# Patient Record
Sex: MALE | URBAN - METROPOLITAN AREA
[De-identification: names, ages, dates, MRNs, and addresses within clinical notes are randomized per-mention and may not be internally consistent; named-entity substitution may affect disease eponyms.]

---

## 2019-10-04 ENCOUNTER — APPOINTMENT (RX ONLY)
Dept: URBAN - METROPOLITAN AREA CLINIC 139 | Facility: CLINIC | Age: 39
Setting detail: DERMATOLOGY
End: 2019-10-04

## 2019-10-04 DIAGNOSIS — L90.5 SCAR CONDITIONS AND FIBROSIS OF SKIN: ICD-10-CM

## 2019-10-04 DIAGNOSIS — D18.0 HEMANGIOMA: ICD-10-CM

## 2019-10-04 DIAGNOSIS — L70.0 ACNE VULGARIS: ICD-10-CM

## 2019-10-04 PROBLEM — D18.01 HEMANGIOMA OF SKIN AND SUBCUTANEOUS TISSUE: Status: ACTIVE | Noted: 2019-10-04

## 2019-10-04 PROCEDURE — ? COUNSELING

## 2019-10-04 PROCEDURE — ? PULSED-DYE LASER

## 2019-10-04 PROCEDURE — 99202 OFFICE O/P NEW SF 15 MIN: CPT

## 2019-10-04 PROCEDURE — ? PRESCRIPTION

## 2019-10-04 PROCEDURE — ? MEDICAL CONSULTATION: PULSED-DYE LASER

## 2019-10-04 PROCEDURE — ? PRESCRIPTION MEDICATION MANAGEMENT

## 2019-10-04 RX ORDER — TAZAROTENE 1 MG/G
1 AEROSOL, FOAM TOPICAL QHS
Qty: 50 | Refills: 1 | Status: ERX | COMMUNITY
Start: 2019-10-04

## 2019-10-04 RX ADMIN — TAZAROTENE 1: 1 AEROSOL, FOAM TOPICAL at 13:16

## 2019-10-04 ASSESSMENT — LOCATION DETAILED DESCRIPTION DERM
LOCATION DETAILED: SUPERIOR THORACIC SPINE
LOCATION DETAILED: LEFT MEDIAL INFERIOR CHEST
LOCATION DETAILED: RIGHT MEDIAL INFERIOR CHEST
LOCATION DETAILED: STERNUM
LOCATION DETAILED: LEFT SUPERIOR MEDIAL UPPER BACK
LOCATION DETAILED: LEFT LATERAL SUPERIOR CHEST
LOCATION DETAILED: LEFT MEDIAL SUPERIOR CHEST
LOCATION DETAILED: RIGHT LATERAL SUPERIOR CHEST

## 2019-10-04 ASSESSMENT — LOCATION SIMPLE DESCRIPTION DERM
LOCATION SIMPLE: LEFT UPPER BACK
LOCATION SIMPLE: CHEST
LOCATION SIMPLE: UPPER BACK

## 2019-10-04 ASSESSMENT — LOCATION ZONE DERM: LOCATION ZONE: TRUNK

## 2019-10-04 NOTE — PROCEDURE: COUNSELING
Detail Level: Detailed
Dapsone Counseling: I discussed with the patient the risks of dapsone including but not limited to hemolytic anemia, agranulocytosis, rashes, methemoglobinemia, kidney failure, peripheral neuropathy, headaches, GI upset, and liver toxicity.  Patients who start dapsone require monitoring including baseline LFTs and weekly CBCs for the first month, then every month thereafter.  The patient verbalized understanding of the proper use and possible adverse effects of dapsone.  All of the patient's questions and concerns were addressed.
Bactrim Counseling:  I discussed with the patient the risks of sulfa antibiotics including but not limited to GI upset, allergic reaction, drug rash, diarrhea, dizziness, photosensitivity, and yeast infections.  Rarely, more serious reactions can occur including but not limited to aplastic anemia, agranulocytosis, methemoglobinemia, blood dyscrasias, liver or kidney failure, lung infiltrates or desquamative/blistering drug rashes.
High Dose Vitamin A Counseling: Side effects reviewed, pt to contact office should one occur.
Erythromycin Counseling:  I discussed with the patient the risks of erythromycin including but not limited to GI upset, allergic reaction, drug rash, diarrhea, increase in liver enzymes, and yeast infections.
Benzoyl Peroxide Counseling: Patient counseled that medicine may cause skin irritation and bleach clothing.  In the event of skin irritation, the patient was advised to reduce the amount of the drug applied or use it less frequently.   The patient verbalized understanding of the proper use and possible adverse effects of benzoyl peroxide.  All of the patient's questions and concerns were addressed.
Spironolactone Counseling: Patient advised regarding risks of diarrhea, abdominal pain, hyperkalemia, birth defects (for female patients), liver toxicity and renal toxicity. The patient may need blood work to monitor liver and kidney function and potassium levels while on therapy. The patient verbalized understanding of the proper use and possible adverse effects of spironolactone.  All of the patient's questions and concerns were addressed.
Tazorac Counseling:  Patient advised that medication is irritating and drying.  Patient may need to apply sparingly and wash off after an hour before eventually leaving it on overnight.  The patient verbalized understanding of the proper use and possible adverse effects of tazorac.  All of the patient's questions and concerns were addressed.
Birth Control Pills Pregnancy And Lactation Text: This medication should be avoided if pregnant and for the first 30 days post-partum.
Azithromycin Pregnancy And Lactation Text: This medication is considered safe during pregnancy and is also secreted in breast milk.
Isotretinoin Pregnancy And Lactation Text: This medication is Pregnancy Category X and is considered extremely dangerous during pregnancy. It is unknown if it is excreted in breast milk.
Doxycycline Pregnancy And Lactation Text: This medication is Pregnancy Category D and not consider safe during pregnancy. It is also excreted in breast milk but is considered safe for shorter treatment courses.
Minocycline Pregnancy And Lactation Text: This medication is Pregnancy Category D and not consider safe during pregnancy. It is also excreted in breast milk.
Topical Clindamycin Pregnancy And Lactation Text: This medication is Pregnancy Category B and is considered safe during pregnancy. It is unknown if it is excreted in breast milk.
Topical Retinoid Pregnancy And Lactation Text: This medication is Pregnancy Category C. It is unknown if this medication is excreted in breast milk.
Azithromycin Counseling:  I discussed with the patient the risks of azithromycin including but not limited to GI upset, allergic reaction, drug rash, diarrhea, and yeast infections.
Doxycycline Counseling:  Patient counseled regarding possible photosensitivity and increased risk for sunburn.  Patient instructed to avoid sunlight, if possible.  When exposed to sunlight, patients should wear protective clothing, sunglasses, and sunscreen.  The patient was instructed to call the office immediately if the following severe adverse effects occur:  hearing changes, easy bruising/bleeding, severe headache, or vision changes.  The patient verbalized understanding of the proper use and possible adverse effects of doxycycline.  All of the patient's questions and concerns were addressed.
Birth Control Pills Counseling: Birth Control Pill Counseling: I discussed with the patient the potential side effects of OCPs including but not limited to increased risk of stroke, heart attack, thrombophlebitis, deep venous thrombosis, hepatic adenomas, breast changes, GI upset, headaches, and depression.  The patient verbalized understanding of the proper use and possible adverse effects of OCPs. All of the patient's questions and concerns were addressed.
Minocycline Counseling: Patient advised regarding possible photosensitivity and discoloration of the teeth, skin, lips, tongue and gums.  Patient instructed to avoid sunlight, if possible.  When exposed to sunlight, patients should wear protective clothing, sunglasses, and sunscreen.  The patient was instructed to call the office immediately if the following severe adverse effects occur:  hearing changes, easy bruising/bleeding, severe headache, or vision changes.  The patient verbalized understanding of the proper use and possible adverse effects of minocycline.  All of the patient's questions and concerns were addressed.
Isotretinoin Counseling: Patient should get monthly blood tests, not donate blood, not drive at night if vision affected, not share medication, and not undergo elective surgery for 6 months after tx completed. Side effects reviewed, pt to contact office should one occur.
Topical Retinoid counseling:  Patient advised to apply a pea-sized amount only at bedtime and wait 30 minutes after washing their face before applying.  If too drying, patient may add a non-comedogenic moisturizer. The patient verbalized understanding of the proper use and possible adverse effects of retinoids.  All of the patient's questions and concerns were addressed.
Tetracycline Counseling: Patient counseled regarding possible photosensitivity and increased risk for sunburn.  Patient instructed to avoid sunlight, if possible.  When exposed to sunlight, patients should wear protective clothing, sunglasses, and sunscreen.  The patient was instructed to call the office immediately if the following severe adverse effects occur:  hearing changes, easy bruising/bleeding, severe headache, or vision changes.  The patient verbalized understanding of the proper use and possible adverse effects of tetracycline.  All of the patient's questions and concerns were addressed. Patient understands to avoid pregnancy while on therapy due to potential birth defects.
Detail Level: Zone
Use Enhanced Medication Counseling?: No
Topical Clindamycin Counseling: Patient counseled that this medication may cause skin irritation or allergic reactions.  In the event of skin irritation, the patient was advised to reduce the amount of the drug applied or use it less frequently.   The patient verbalized understanding of the proper use and possible adverse effects of clindamycin.  All of the patient's questions and concerns were addressed.
Bactrim Pregnancy And Lactation Text: This medication is Pregnancy Category D and is known to cause fetal risk.  It is also excreted in breast milk.
Erythromycin Pregnancy And Lactation Text: This medication is Pregnancy Category B and is considered safe during pregnancy. It is also excreted in breast milk.
Dapsone Pregnancy And Lactation Text: This medication is Pregnancy Category C and is not considered safe during pregnancy or breast feeding.
Topical Sulfur Applications Counseling: Topical Sulfur Counseling: Patient counseled that this medication may cause skin irritation or allergic reactions.  In the event of skin irritation, the patient was advised to reduce the amount of the drug applied or use it less frequently.   The patient verbalized understanding of the proper use and possible adverse effects of topical sulfur application.  All of the patient's questions and concerns were addressed.
Spironolactone Pregnancy And Lactation Text: This medication can cause feminization of the male fetus and should be avoided during pregnancy. The active metabolite is also found in breast milk.
High Dose Vitamin A Pregnancy And Lactation Text: High dose vitamin A therapy is contraindicated during pregnancy and breast feeding.
Topical Sulfur Applications Pregnancy And Lactation Text: This medication is Pregnancy Category C and has an unknown safety profile during pregnancy. It is unknown if this topical medication is excreted in breast milk.
Tazorac Pregnancy And Lactation Text: This medication is not safe during pregnancy. It is unknown if this medication is excreted in breast milk.
Benzoyl Peroxide Pregnancy And Lactation Text: This medication is Pregnancy Category C. It is unknown if benzoyl peroxide is excreted in breast milk.

## 2019-10-04 NOTE — HPI: SCAR (COMPLEX), BACK
How Severe Is The Scar?: mild
What Are Your Concerns? (Check All That Apply): texture
Is This A New Presentation, Or A Follow-Up?: Back Scar
Any Other Pertinent Features?: Patient uses OTC benzoyl peroxide acne lotion which helps prevent acne breakouts.

## 2019-10-04 NOTE — PROCEDURE: PULSED-DYE LASER
Spot Size: 7 mm
Delay Time (Ms): 20
Pulse Duration: 10 ms
Treated Area: large area
Post-Care Instructions: I reviewed with the patient in detail post-care instructions. Patient should stay away from the sun and wear sun protection until treated areas are fully healed.
Laser Type: Vbeam 595nm
Pulse Duration: 3 ms
Cryogen Time (Ms): 30
Post-Procedure Care: Vaseline and ice applied. Post care reviewed with patient.
Fluence In J/Cm2 (Optional): 7.5
Detail Level: Zone
Consent: Written consent obtained, risks reviewed including but not limited to crusting, scabbing, blistering, scarring, darker or lighter pigmentary change, incidental hair removal, bruising, and/or incomplete removal.
Post-Procedure Care: Post care reviewed with patient.
Cryogen Time (Ms): 30

## 2019-10-04 NOTE — PROCEDURE: PRESCRIPTION MEDICATION MANAGEMENT
Detail Level: Zone
Render In Strict Bullet Format?: No
Initiate Treatment: Fabior as prescribed above. BPO 10% wash qd in shower, leave on for 5 min prior to rinsing.

## 2019-10-08 ENCOUNTER — RX ONLY (OUTPATIENT)
Age: 39
Setting detail: RX ONLY
End: 2019-10-08

## 2019-10-08 RX ORDER — TAZAROTENE 1 MG/G
AEROSOL, FOAM TOPICAL QHS
Qty: 1 | Refills: 0

## 2019-10-17 ENCOUNTER — RX ONLY (OUTPATIENT)
Age: 39
Setting detail: RX ONLY
End: 2019-10-17

## 2019-10-17 RX ORDER — TRETINOIN 1 MG/G
1 CREAM TOPICAL QHS
Qty: 1 | Refills: 1 | COMMUNITY
Start: 2019-10-17

## 2019-11-01 ENCOUNTER — APPOINTMENT (RX ONLY)
Dept: URBAN - METROPOLITAN AREA CLINIC 139 | Facility: CLINIC | Age: 39
Setting detail: DERMATOLOGY
End: 2019-11-01

## 2019-11-01 DIAGNOSIS — L90.5 SCAR CONDITIONS AND FIBROSIS OF SKIN: ICD-10-CM

## 2019-11-01 PROCEDURE — 99213 OFFICE O/P EST LOW 20 MIN: CPT

## 2019-11-01 PROCEDURE — ? COUNSELING

## 2019-11-01 NOTE — PROCEDURE: COUNSELING
Patient Specific Counseling (Will Not Stick From Patient To Patient): Discussed with pt to mix the tretinoin with a moisturizer to help with dryness.
Detail Level: Detailed

## 2019-12-19 ENCOUNTER — APPOINTMENT (RX ONLY)
Dept: URBAN - METROPOLITAN AREA CLINIC 139 | Facility: CLINIC | Age: 39
Setting detail: DERMATOLOGY
End: 2019-12-19

## 2019-12-19 DIAGNOSIS — L90.5 SCAR CONDITIONS AND FIBROSIS OF SKIN: ICD-10-CM

## 2019-12-19 PROCEDURE — ? ADDITIONAL NOTES

## 2019-12-19 PROCEDURE — 99213 OFFICE O/P EST LOW 20 MIN: CPT

## 2019-12-19 PROCEDURE — ? PULSED-DYE LASER

## 2019-12-19 PROCEDURE — ? COUNSELING

## 2019-12-19 ASSESSMENT — LOCATION ZONE DERM
LOCATION ZONE: TRUNK
LOCATION ZONE: NECK

## 2019-12-19 ASSESSMENT — LOCATION DETAILED DESCRIPTION DERM
LOCATION DETAILED: LEFT SUPERIOR LATERAL UPPER BACK
LOCATION DETAILED: STERNUM
LOCATION DETAILED: MID TRAPEZIAL NECK

## 2019-12-19 ASSESSMENT — LOCATION SIMPLE DESCRIPTION DERM
LOCATION SIMPLE: TRAPEZIAL NECK
LOCATION SIMPLE: LEFT UPPER BACK
LOCATION SIMPLE: CHEST

## 2019-12-19 NOTE — PROCEDURE: PULSED-DYE LASER
Post-Procedure Care: Post care reviewed with patient.
Spot Size: 7 mm
Cryogen Time (Ms): 30
Pulse Duration: 10 ms
Price (Use Numbers Only, No Special Characters Or $): 0
Immediate Endpoint: erythema
Delay Time (Ms): 20
Detail Level: Zone
Cryogen Time (Ms): 30
Consent: Written consent obtained, risks reviewed including but not limited to crusting, scabbing, blistering, scarring, darker or lighter pigmentary change, incidental hair removal, bruising, and/or incomplete removal.
Treated Area: medium area
Laser Type: Vbeam 595nm
Pulse Duration: 3 ms
Post-Care Instructions: I reviewed with the patient in detail post-care instructions. Patient should stay away from the sun and wear sun protection until treated areas are fully healed.
Fluence In J/Cm2 (Optional): 7.5
breastfeeding exclusively

## 2019-12-19 NOTE — PROCEDURE: ADDITIONAL NOTES
Detail Level: Simple
Additional Notes: Pt sent from [facility] for [facility]L, no cosmetic charge.\\nContinue tretinoin qohs

## 2020-01-09 ENCOUNTER — APPOINTMENT (RX ONLY)
Dept: URBAN - METROPOLITAN AREA CLINIC 139 | Facility: CLINIC | Age: 40
Setting detail: DERMATOLOGY
End: 2020-01-09

## 2020-01-09 DIAGNOSIS — L90.5 SCAR CONDITIONS AND FIBROSIS OF SKIN: ICD-10-CM

## 2020-01-09 PROCEDURE — ? ADDITIONAL NOTES

## 2020-01-09 PROCEDURE — ? PULSED-DYE LASER

## 2020-01-09 PROCEDURE — 99213 OFFICE O/P EST LOW 20 MIN: CPT

## 2020-01-09 ASSESSMENT — LOCATION SIMPLE DESCRIPTION DERM
LOCATION SIMPLE: RIGHT SHOULDER
LOCATION SIMPLE: LEFT SHOULDER
LOCATION SIMPLE: UPPER BACK

## 2020-01-09 ASSESSMENT — LOCATION DETAILED DESCRIPTION DERM
LOCATION DETAILED: LEFT POSTERIOR SHOULDER
LOCATION DETAILED: RIGHT POSTERIOR SHOULDER
LOCATION DETAILED: SUPERIOR THORACIC SPINE

## 2020-01-09 ASSESSMENT — LOCATION ZONE DERM
LOCATION ZONE: ARM
LOCATION ZONE: TRUNK

## 2020-01-09 NOTE — PROCEDURE: PULSED-DYE LASER
Treated Area: medium area
Cryogen Time (Ms): 30
Post-Care Instructions: I reviewed with the patient in detail post-care instructions. Patient should stay away from the sun and wear sun protection until treated areas are fully healed.
Delay Time (Ms): 20
Post-Procedure Care: Post care reviewed with patient.
Fluence In J/Cm2 (Optional): 7.5
Spot Size: 7 mm
Pulse Duration: 10 ms
Immediate Endpoint: erythema
Detail Level: Zone
Laser Type: Vbeam 595nm
Pulse Duration: 3 ms
Consent: Written consent obtained, risks reviewed including but not limited to crusting, scabbing, blistering, scarring, darker or lighter pigmentary change, incidental hair removal, bruising, and/or incomplete removal.

## 2020-01-09 NOTE — PROCEDURE: ADDITIONAL NOTES
Additional Notes: Pt sent from [facility] for [facility], no cosmetic charge. Continue tretinoin every other night
Detail Level: Zone

## 2020-01-30 ENCOUNTER — APPOINTMENT (RX ONLY)
Dept: URBAN - METROPOLITAN AREA CLINIC 139 | Facility: CLINIC | Age: 40
Setting detail: DERMATOLOGY
End: 2020-01-30

## 2020-01-30 DIAGNOSIS — L90.5 SCAR CONDITIONS AND FIBROSIS OF SKIN: ICD-10-CM | Status: RESOLVING

## 2020-01-30 PROCEDURE — 99213 OFFICE O/P EST LOW 20 MIN: CPT

## 2020-01-30 PROCEDURE — ? PULSED-DYE LASER

## 2020-01-30 ASSESSMENT — LOCATION SIMPLE DESCRIPTION DERM
LOCATION SIMPLE: UPPER BACK
LOCATION SIMPLE: RIGHT SHOULDER
LOCATION SIMPLE: LEFT SHOULDER

## 2020-01-30 ASSESSMENT — LOCATION ZONE DERM
LOCATION ZONE: ARM
LOCATION ZONE: TRUNK

## 2020-01-30 NOTE — PROCEDURE: PULSED-DYE LASER
Pulse Duration: 10 ms
Post-Care Instructions: I reviewed with the patient in detail post-care instructions. Patient should stay away from the sun and wear sun protection until treated areas are fully healed.
Post-Procedure Care: Post care reviewed with patient.
Cryogen Time (Ms): 30
Spot Size: 7 mm
Detail Level: Zone
Pulse Duration: 3 ms
Consent: Written consent obtained, risks reviewed including but not limited to crusting, scabbing, blistering, scarring, darker or lighter pigmentary change, incidental hair removal, bruising, and/or incomplete removal.
Delay Time (Ms): 20
Immediate Endpoint: erythema
Fluence In J/Cm2 (Optional): 7.5
Laser Type: Vbeam 595nm
Treated Area: medium area

## 2020-02-27 ENCOUNTER — APPOINTMENT (RX ONLY)
Dept: URBAN - METROPOLITAN AREA CLINIC 139 | Facility: CLINIC | Age: 40
Setting detail: DERMATOLOGY
End: 2020-02-27

## 2020-02-27 DIAGNOSIS — L90.5 SCAR CONDITIONS AND FIBROSIS OF SKIN: ICD-10-CM | Status: IMPROVED

## 2020-02-27 PROCEDURE — 99213 OFFICE O/P EST LOW 20 MIN: CPT

## 2020-02-27 PROCEDURE — ? ADDITIONAL NOTES

## 2020-02-27 PROCEDURE — ? COUNSELING

## 2020-02-27 ASSESSMENT — LOCATION ZONE DERM: LOCATION ZONE: TRUNK

## 2020-02-27 ASSESSMENT — LOCATION DETAILED DESCRIPTION DERM: LOCATION DETAILED: SUPERIOR THORACIC SPINE

## 2020-02-27 ASSESSMENT — LOCATION SIMPLE DESCRIPTION DERM: LOCATION SIMPLE: UPPER BACK

## 2020-02-27 NOTE — PROCEDURE: ADDITIONAL NOTES
Additional Notes: Significant improvement with PDL. Before and after photos reviewed with patient. He is happy with results. Will f/u prn.
Detail Level: Simple

## 2024-04-17 ENCOUNTER — NEW PATIENT COMPREHENSIVE (OUTPATIENT)
Dept: URBAN - METROPOLITAN AREA CLINIC 75 | Facility: CLINIC | Age: 44
End: 2024-04-17

## 2024-04-17 DIAGNOSIS — H43.813: ICD-10-CM

## 2024-04-17 DIAGNOSIS — H43.393: ICD-10-CM

## 2024-04-17 PROCEDURE — 92004 COMPRE OPH EXAM NEW PT 1/>: CPT

## 2024-04-17 ASSESSMENT — VISUAL ACUITY
OD_SC: 20/20
OU_SC: 20/20
OS_SC: 20/25-3

## 2024-04-17 ASSESSMENT — TONOMETRY
OD_IOP_MMHG: 15
OS_IOP_MMHG: 15

## 2024-04-29 ENCOUNTER — PROCEDURE ONLY (OUTPATIENT)
Dept: URBAN - METROPOLITAN AREA CLINIC 42 | Facility: CLINIC | Age: 44
End: 2024-04-29

## 2024-04-29 DIAGNOSIS — H43.312: ICD-10-CM

## 2024-04-29 PROCEDURE — 67031 LASER SURGERY EYE STRANDS: CPT

## 2024-04-30 ENCOUNTER — 1 DAY POST-OP (OUTPATIENT)
Dept: URBAN - METROPOLITAN AREA CLINIC 42 | Facility: CLINIC | Age: 44
End: 2024-04-30

## 2024-04-30 DIAGNOSIS — H43.312: ICD-10-CM

## 2024-04-30 PROCEDURE — 99024 POSTOP FOLLOW-UP VISIT: CPT

## 2024-04-30 ASSESSMENT — VISUAL ACUITY: OS_SC: 20/25-1

## 2024-04-30 ASSESSMENT — TONOMETRY: OS_IOP_MMHG: 14

## 2024-06-17 ENCOUNTER — 1 MONTH POST-OP (OUTPATIENT)
Dept: URBAN - METROPOLITAN AREA CLINIC 42 | Facility: CLINIC | Age: 44
End: 2024-06-17

## 2024-06-17 DIAGNOSIS — H43.313: ICD-10-CM

## 2024-06-17 PROCEDURE — 99024 POSTOP FOLLOW-UP VISIT: CPT

## 2024-06-17 ASSESSMENT — VISUAL ACUITY
OD_SC: 20/25-1
OS_SC: 20/30-2

## 2024-06-17 ASSESSMENT — TONOMETRY: OS_IOP_MMHG: 17

## 2024-07-29 ENCOUNTER — INTERMEDIATE FOLLOW-UP (OUTPATIENT)
Dept: URBAN - METROPOLITAN AREA CLINIC 42 | Facility: CLINIC | Age: 44
End: 2024-07-29

## 2024-07-29 DIAGNOSIS — H43.313: ICD-10-CM

## 2024-07-29 PROCEDURE — 99213 OFFICE O/P EST LOW 20 MIN: CPT

## 2024-08-08 ENCOUNTER — PROCEDURE ONLY (OUTPATIENT)
Dept: URBAN - METROPOLITAN AREA CLINIC 42 | Facility: CLINIC | Age: 44
End: 2024-08-08

## 2024-08-08 DIAGNOSIS — H43.313: ICD-10-CM

## 2024-08-08 PROCEDURE — 67031 LASER SURGERY EYE STRANDS: CPT

## 2024-08-09 ENCOUNTER — 1 DAY POST-OP (OUTPATIENT)
Dept: URBAN - METROPOLITAN AREA CLINIC 42 | Facility: CLINIC | Age: 44
End: 2024-08-09

## 2024-08-09 DIAGNOSIS — H43.313: ICD-10-CM

## 2024-08-09 PROCEDURE — 99024 POSTOP FOLLOW-UP VISIT: CPT

## 2024-08-09 ASSESSMENT — VISUAL ACUITY: OD_SC: 20/20

## 2024-08-09 ASSESSMENT — TONOMETRY: OD_IOP_MMHG: 11

## 2024-09-30 ENCOUNTER — 1 MONTH POST-OP (OUTPATIENT)
Dept: URBAN - METROPOLITAN AREA CLINIC 42 | Facility: CLINIC | Age: 44
End: 2024-09-30

## 2024-09-30 DIAGNOSIS — H43.313: ICD-10-CM

## 2024-09-30 PROCEDURE — 99024 POSTOP FOLLOW-UP VISIT: CPT

## 2024-09-30 ASSESSMENT — TONOMETRY
OD_IOP_MMHG: 14
OS_IOP_MMHG: 12

## 2024-09-30 ASSESSMENT — VISUAL ACUITY
OS_SC: 20/25-2
OD_SC: 20/20-1

## 2024-10-24 ENCOUNTER — NEW PATIENT (OUTPATIENT)
Dept: URBAN - METROPOLITAN AREA CLINIC 80 | Facility: CLINIC | Age: 44
End: 2024-10-24

## 2024-10-24 DIAGNOSIS — H43.393: ICD-10-CM

## 2024-10-24 PROCEDURE — 92004 COMPRE OPH EXAM NEW PT 1/>: CPT

## 2024-10-24 PROCEDURE — 92201 OPSCPY EXTND RTA DRAW UNI/BI: CPT

## 2024-10-24 PROCEDURE — 92134 CPTRZ OPH DX IMG PST SGM RTA: CPT | Mod: NC

## 2024-10-24 ASSESSMENT — TONOMETRY
OS_IOP_MMHG: 17
OD_IOP_MMHG: 14

## 2024-10-24 ASSESSMENT — VISUAL ACUITY
OD_SC: 20/20-1
OS_SC: 20/25-1

## (undated) RX ORDER — TOBRAMYCIN AND DEXAMETHASONE 1; 3 MG/ML; MG/ML: 1 SUSPENSION/ DROPS OPHTHALMIC